# Patient Record
Sex: MALE | Race: WHITE | NOT HISPANIC OR LATINO | Employment: OTHER | URBAN - METROPOLITAN AREA
[De-identification: names, ages, dates, MRNs, and addresses within clinical notes are randomized per-mention and may not be internally consistent; named-entity substitution may affect disease eponyms.]

---

## 2019-05-08 PROBLEM — R13.19 ESOPHAGEAL DYSPHAGIA: Status: ACTIVE | Noted: 2019-05-08

## 2019-05-08 PROBLEM — Z12.11 ENCOUNTER FOR SCREENING FOR MALIGNANT NEOPLASM OF COLON: Status: ACTIVE | Noted: 2019-05-08

## 2023-09-09 ENCOUNTER — HOSPITAL ENCOUNTER (EMERGENCY)
Facility: HOSPITAL | Age: 68
Discharge: OTHER INSTITUTION | End: 2023-09-10
Attending: EMERGENCY MEDICINE
Payer: MEDICARE

## 2023-09-09 ENCOUNTER — APPOINTMENT (OUTPATIENT)
Dept: GENERAL RADIOLOGY | Facility: HOSPITAL | Age: 68
End: 2023-09-09
Payer: MEDICARE

## 2023-09-09 ENCOUNTER — APPOINTMENT (OUTPATIENT)
Dept: CT IMAGING | Facility: HOSPITAL | Age: 68
End: 2023-09-09
Payer: MEDICARE

## 2023-09-09 VITALS
BODY MASS INDEX: 26.6 KG/M2 | SYSTOLIC BLOOD PRESSURE: 164 MMHG | DIASTOLIC BLOOD PRESSURE: 104 MMHG | HEIGHT: 71 IN | HEART RATE: 80 BPM | RESPIRATION RATE: 18 BRPM | WEIGHT: 190 LBS | TEMPERATURE: 97 F | OXYGEN SATURATION: 93 %

## 2023-09-09 DIAGNOSIS — S62.91XB OPEN FRACTURE OF RIGHT HAND, INITIAL ENCOUNTER: Primary | ICD-10-CM

## 2023-09-09 DIAGNOSIS — S10.93XA CONTUSION OF NECK, INITIAL ENCOUNTER: ICD-10-CM

## 2023-09-09 LAB
ANION GAP SERPL CALCULATED.3IONS-SCNC: 9 MMOL/L (ref 5–15)
BASOPHILS # BLD AUTO: 0.04 10*3/MM3 (ref 0–0.2)
BASOPHILS NFR BLD AUTO: 0.3 % (ref 0–1.5)
BUN SERPL-MCNC: 22 MG/DL (ref 8–23)
BUN/CREAT SERPL: 22.9 (ref 7–25)
CALCIUM SPEC-SCNC: 9.3 MG/DL (ref 8.6–10.5)
CHLORIDE SERPL-SCNC: 103 MMOL/L (ref 98–107)
CO2 SERPL-SCNC: 27 MMOL/L (ref 22–29)
CREAT SERPL-MCNC: 0.96 MG/DL (ref 0.76–1.27)
DEPRECATED RDW RBC AUTO: 39.6 FL (ref 37–54)
EGFRCR SERPLBLD CKD-EPI 2021: 87.2 ML/MIN/1.73
EOSINOPHIL # BLD AUTO: 0.14 10*3/MM3 (ref 0–0.4)
EOSINOPHIL NFR BLD AUTO: 1 % (ref 0.3–6.2)
ERYTHROCYTE [DISTWIDTH] IN BLOOD BY AUTOMATED COUNT: 12.2 % (ref 12.3–15.4)
GLUCOSE SERPL-MCNC: 109 MG/DL (ref 65–99)
HCT VFR BLD AUTO: 47.6 % (ref 37.5–51)
HGB BLD-MCNC: 16.4 G/DL (ref 13–17.7)
IMM GRANULOCYTES # BLD AUTO: 0.08 10*3/MM3 (ref 0–0.05)
IMM GRANULOCYTES NFR BLD AUTO: 0.6 % (ref 0–0.5)
LYMPHOCYTES # BLD AUTO: 1.02 10*3/MM3 (ref 0.7–3.1)
LYMPHOCYTES NFR BLD AUTO: 7.2 % (ref 19.6–45.3)
MCH RBC QN AUTO: 31 PG (ref 26.6–33)
MCHC RBC AUTO-ENTMCNC: 34.5 G/DL (ref 31.5–35.7)
MCV RBC AUTO: 90 FL (ref 79–97)
MONOCYTES # BLD AUTO: 1.05 10*3/MM3 (ref 0.1–0.9)
MONOCYTES NFR BLD AUTO: 7.4 % (ref 5–12)
NEUTROPHILS NFR BLD AUTO: 11.83 10*3/MM3 (ref 1.7–7)
NEUTROPHILS NFR BLD AUTO: 83.5 % (ref 42.7–76)
NRBC BLD AUTO-RTO: 0 /100 WBC (ref 0–0.2)
PLATELET # BLD AUTO: 224 10*3/MM3 (ref 140–450)
PMV BLD AUTO: 9 FL (ref 6–12)
POTASSIUM SERPL-SCNC: 3.9 MMOL/L (ref 3.5–5.2)
RBC # BLD AUTO: 5.29 10*6/MM3 (ref 4.14–5.8)
SODIUM SERPL-SCNC: 139 MMOL/L (ref 136–145)
WBC NRBC COR # BLD: 14.16 10*3/MM3 (ref 3.4–10.8)

## 2023-09-09 PROCEDURE — 70498 CT ANGIOGRAPHY NECK: CPT

## 2023-09-09 PROCEDURE — 99285 EMERGENCY DEPT VISIT HI MDM: CPT

## 2023-09-09 PROCEDURE — 85025 COMPLETE CBC W/AUTO DIFF WBC: CPT | Performed by: EMERGENCY MEDICINE

## 2023-09-09 PROCEDURE — 73130 X-RAY EXAM OF HAND: CPT

## 2023-09-09 PROCEDURE — 25010000002 DIPHENHYDRAMINE PER 50 MG: Performed by: EMERGENCY MEDICINE

## 2023-09-09 PROCEDURE — 96375 TX/PRO/DX INJ NEW DRUG ADDON: CPT

## 2023-09-09 PROCEDURE — 25510000001 IOPAMIDOL PER 1 ML: Performed by: EMERGENCY MEDICINE

## 2023-09-09 PROCEDURE — 80048 BASIC METABOLIC PNL TOTAL CA: CPT | Performed by: EMERGENCY MEDICINE

## 2023-09-09 PROCEDURE — 96365 THER/PROPH/DIAG IV INF INIT: CPT

## 2023-09-09 PROCEDURE — 25010000002 CEFAZOLIN IN DEXTROSE 2-4 GM/100ML-% SOLUTION: Performed by: EMERGENCY MEDICINE

## 2023-09-09 RX ORDER — HYDROCHLOROTHIAZIDE 12.5 MG/1
12.5 TABLET ORAL DAILY
COMMUNITY

## 2023-09-09 RX ORDER — DIPHENHYDRAMINE HYDROCHLORIDE 50 MG/ML
25 INJECTION INTRAMUSCULAR; INTRAVENOUS ONCE
Status: DISCONTINUED | OUTPATIENT
Start: 2023-09-09 | End: 2023-09-09

## 2023-09-09 RX ORDER — LIDOCAINE HYDROCHLORIDE AND EPINEPHRINE 10; 10 MG/ML; UG/ML
10 INJECTION, SOLUTION INFILTRATION; PERINEURAL ONCE
Status: COMPLETED | OUTPATIENT
Start: 2023-09-09 | End: 2023-09-09

## 2023-09-09 RX ORDER — DIPHENHYDRAMINE HYDROCHLORIDE 50 MG/ML
25 INJECTION INTRAMUSCULAR; INTRAVENOUS ONCE
Status: COMPLETED | OUTPATIENT
Start: 2023-09-09 | End: 2023-09-09

## 2023-09-09 RX ORDER — CEFAZOLIN SODIUM 2 G/100ML
2000 INJECTION, SOLUTION INTRAVENOUS ONCE
Status: COMPLETED | OUTPATIENT
Start: 2023-09-09 | End: 2023-09-09

## 2023-09-09 RX ORDER — DIPHENHYDRAMINE HYDROCHLORIDE 50 MG/ML
50 INJECTION INTRAMUSCULAR; INTRAVENOUS ONCE
Status: DISCONTINUED | OUTPATIENT
Start: 2023-09-09 | End: 2023-09-09

## 2023-09-09 RX ADMIN — DIPHENHYDRAMINE HYDROCHLORIDE 25 MG: 50 INJECTION, SOLUTION INTRAMUSCULAR; INTRAVENOUS at 21:23

## 2023-09-09 RX ADMIN — CEFAZOLIN SODIUM 2000 MG: 2 INJECTION, SOLUTION INTRAVENOUS at 19:10

## 2023-09-09 RX ADMIN — LIDOCAINE HYDROCHLORIDE,EPINEPHRINE BITARTRATE 10 ML: 10; .01 INJECTION, SOLUTION INFILTRATION; PERINEURAL at 19:09

## 2023-09-09 RX ADMIN — IOPAMIDOL 95 ML: 755 INJECTION, SOLUTION INTRAVENOUS at 20:57

## 2023-09-09 NOTE — ED NOTES
Pt was grinding using a round grind tool and cut left hand and left side of his neck. Pt denies blood thinners.   No

## 2023-09-09 NOTE — ED NOTES
Patient states he was grinding a piece of steel when the blade exploded. Patient c/o laceration to left hand and injury to his right shoulder.

## 2023-09-09 NOTE — ED PROVIDER NOTES
EMERGENCY DEPARTMENT ENCOUNTER    Room Number:  17/17  Date of encounter:  9/10/2023  PCP: Mirza Gotti MD  Historian: Patient and spouse  Relevant information and history provided by sources other than the patient will be included below and in the ED Course.  Review of pertinent past medical records may also be included in record below and ED Course.    HPI:  Chief Complaint: Injury to left hand and neck  A complete HPI/ROS/PMH/PSH/SH/FH are unobtainable due to: Not applicable  Context: Polo Pack is a 67 y.o. male who presents to the ED c/o this is a patient that was using a  with a steel blade.  He was grinding steel.  This is a very dirty and greasy and oily job.  The blade ruptured called the causing a laceration to his left hand.  Also had some abrasions to the right side of his neck.  He denies any numbness or tingling.  His tetanus is up-to-date.  This happened just prior to arrival here.  Currently he has no pain unless you go and press on his left hand.        Previous Episodes: No  Current Symptoms: See above    MEDICAL HISTORY REVIEWED    This is a history of hypertension, GERD.  He is not on any blood thinning medicine.    PAST MEDICAL HISTORY  Active Ambulatory Problems     Diagnosis Date Noted    Esophageal dysphagia 05/08/2019    Encounter for screening for malignant neoplasm of colon 05/08/2019    Esophageal stricture 05/05/2022     Resolved Ambulatory Problems     Diagnosis Date Noted    No Resolved Ambulatory Problems     Past Medical History:   Diagnosis Date    GERD (gastroesophageal reflux disease)     Hypertension     Seasonal allergies     Swallowing difficulty          PAST SURGICAL HISTORY  Past Surgical History:   Procedure Laterality Date    COLONOSCOPY      COLONOSCOPY N/A 07/23/2019    Procedure: COLONOSCOPY to cecum with cold polypectomy;  Surgeon: Meghann Zamora MD;  Location: [facility] ENDOSCOPY;  Service: Gastroenterology    ENDOSCOPY N/A 07/23/2019     Procedure: ESOPHAGOGASTRODUODENOSCOPY with balloon dilation 10-12mm, biopses;  Surgeon: Meghann Zamora MD;  Location: [facility] ENDOSCOPY;  Service: Gastroenterology    ENDOSCOPY N/A 5/17/2022    Procedure: ESOPHAGOGASTRODUODENOSCOPY WITH BALLOON DILATATION 10-11-12-13.5-15MM;  Surgeon: Meghann Zamora MD;  Location: [facility] ENDOSCOPY;  Service: Gastroenterology;  Laterality: N/A;  PRE- dysphagia, hx stricture  POST- esophageal stricture         FAMILY HISTORY  Family History   Problem Relation Age of Onset    Cancer Mother     No Known Problems Father     Malig Hyperthermia Neg Hx          SOCIAL HISTORY  Social History     Socioeconomic History    Marital status:    Tobacco Use    Smoking status: Never    Smokeless tobacco: Never   Vaping Use    Vaping Use: Never used   Substance and Sexual Activity    Alcohol use: No    Drug use: No    Sexual activity: Yes     Partners: Female     Birth control/protection: None         ALLERGIES  Patient has no known allergies.        REVIEW OF SYSTEMS  Review of Systems     All systems reviewed and negative except for those discussed in HPI.       PHYSICAL EXAM    I have reviewed the triage vital signs and nursing notes.    ED Triage Vitals   Temp Heart Rate Resp BP SpO2   09/09/23 1749 09/09/23 1749 09/09/23 1749 09/09/23 1826 09/09/23 1749   97.3 °F (36.3 °C) 60 20 (!) 167/104 97 %      Temp src Heart Rate Source Patient Position BP Location FiO2 (%)   09/09/23 1749 09/09/23 1749 -- -- --   Tympanic Monitor          GENERAL: Elderly male.  No acute distress.Vital signs on my initial evaluation have been reviewed and unremarkable  HENT: nares patent  Head/neck/ face are symmetric without gross deformity, signs of trauma, or swelling  EYES: no scleral icterus, no conjunctival pallor.  NECK: Supple, no meningismus.  Patient has to the lateral portion of his neck he has a very superficial abrasion.  But he does have some tenderness and mild amount of swelling.  It is not  a has expanding hematoma.  There is no active bleeding.  To the area of his distal clavicle on the right he has another area of superficial abrasion.  There is no bleeding.  CV: regular rhythm, regular rate with intact distal pulses.  RESPIRATORY: normal effort and no respiratory distress.  ABDOMEN: soft and nontender.  MUSCULOSKELETAL: no deformity.  To his left hand right in his thenar eminence and the palm of his left hand has a deep laceration through the muscle.  His capillary refill is intact he is got good blood flow.  He has no motor or sensory impairment with good flexion, extension to his thumb index finger and middle finger.  No active bleeding.  Is also important to note that his hands are very dirty.  There is no obvious foreign body appreciated.  The laceration is through the muscle belly of the thenar eminence.  NEURO: alert and appropriate, moves all extremities, follows commands.  No focal motor or sensory changes  SKIN: warm, dry    Vital signs and nursing notes reviewed.        LAB RESULTS  Recent Results (from the past 24 hour(s))   Basic Metabolic Panel    Collection Time: 09/09/23  7:18 PM    Specimen: Blood   Result Value Ref Range    Glucose 109 (H) 65 - 99 mg/dL    BUN 22 8 - 23 mg/dL    Creatinine 0.96 0.76 - 1.27 mg/dL    Sodium 139 136 - 145 mmol/L    Potassium 3.9 3.5 - 5.2 mmol/L    Chloride 103 98 - 107 mmol/L    CO2 27.0 22.0 - 29.0 mmol/L    Calcium 9.3 8.6 - 10.5 mg/dL    BUN/Creatinine Ratio 22.9 7.0 - 25.0    Anion Gap 9.0 5.0 - 15.0 mmol/L    eGFR 87.2 >60.0 mL/min/1.73   CBC Auto Differential    Collection Time: 09/09/23  7:18 PM    Specimen: Blood   Result Value Ref Range    WBC 14.16 (H) 3.40 - 10.80 10*3/mm3    RBC 5.29 4.14 - 5.80 10*6/mm3    Hemoglobin 16.4 13.0 - 17.7 g/dL    Hematocrit 47.6 37.5 - 51.0 %    MCV 90.0 79.0 - 97.0 fL    MCH 31.0 26.6 - 33.0 pg    MCHC 34.5 31.5 - 35.7 g/dL    RDW 12.2 (L) 12.3 - 15.4 %    RDW-SD 39.6 37.0 - 54.0 fl    MPV 9.0 6.0 - 12.0 fL     Platelets 224 140 - 450 10*3/mm3    Neutrophil % 83.5 (H) 42.7 - 76.0 %    Lymphocyte % 7.2 (L) 19.6 - 45.3 %    Monocyte % 7.4 5.0 - 12.0 %    Eosinophil % 1.0 0.3 - 6.2 %    Basophil % 0.3 0.0 - 1.5 %    Immature Grans % 0.6 (H) 0.0 - 0.5 %    Neutrophils, Absolute 11.83 (H) 1.70 - 7.00 10*3/mm3    Lymphocytes, Absolute 1.02 0.70 - 3.10 10*3/mm3    Monocytes, Absolute 1.05 (H) 0.10 - 0.90 10*3/mm3    Eosinophils, Absolute 0.14 0.00 - 0.40 10*3/mm3    Basophils, Absolute 0.04 0.00 - 0.20 10*3/mm3    Immature Grans, Absolute 0.08 (H) 0.00 - 0.05 10*3/mm3    nRBC 0.0 0.0 - 0.2 /100 WBC       Ordered the above labs and independently reviewed the results.        RADIOLOGY  XR Hand 3+ View Left    Result Date: 9/9/2023  XR HAND 3+ VW LEFT-  HISTORY: 67-year-old male with laceration to hand. Evaluate for radiopaque foreign body.  FINDINGS: There is a comminuted predominantly obliquely oriented fracture at the shaft of the first metacarpal and there is a prominent soft tissue laceration adjacently. The multiple punctate hyperdensities within the soft tissues in this region may be overlying the skin, but foreign bodies within the soft tissues can't be excluded. After copious irrigation, a follow-up radiograph of the thumb is recommended.      CT Angiogram Neck    Result Date: 9/9/2023  Patient: JJ BARKER  Time Out: 23:10 Exam(s): CTA NECK EXAM:   CT Angiography Neck With Intravenous Contrast CLINICAL HISTORY:    Reason for exam: Using a . Has a contusion to the right lateral portion of his neck that is mild. Given mechanism of injury I did wanted to rule out any vascular injury.. TECHNIQUE:   Routine carotid CT angiography protocol was performed with intravenous contrast.  NASCET criteria using the distal ICAs for comparison were used for evaluation of stenoses.  CTDI is 46.3 mGy and DLP is 1346.9 mGy-cm.  This CT exam was performed according to the principle of ALARA (As Low As Reasonably Achievable)  by using one or more of the following dose reduction techniques: automated exposure control, adjustment of the mA and or kV according to patient size, and or use of iterative reconstruction technique. MIP reconstructed images were created and reviewed.  Zchmus256 95ml IV contrast is injected for this exam. COMPARISON:   None. FINDINGS:   Right common carotid artery:   Patent.   Right internal carotid artery:   Patent.   Right vertebral artery:    Patent.   Left common carotid artery:    Patent.   Left internal carotid artery:  Patent.   Left vertebral artery:    Patent.  Codominant.   Other: No significant atherosclerosis or stenosis. IMPRESSION: 1.  No dissection, occlusion, or significant stenosis.  CAROTID STENOSIS REFERENCE USING NASCET CRITERIA:   % ICA stenosis = (1 - narrowest ICA diameter diameter of distal cervical ICA) x 100.   Mild - <50% stenosis.   Moderate - 50-69% stenosis.   Severe - 70-94% stenosis.   Near occlusion - 95-99% stenosis.   Occluded - 100% stenosis.     Electronically signed by Mara Chun M.D. on 09-09-23 at 2310     I ordered the above noted radiological studies. Reviewed by me and discussed with radiologist.  See dictation for official radiology interpretation.      PROCEDURES    Procedures      MEDICATIONS GIVEN IN ER    Medications   ceFAZolin in dextrose (ANCEF) IVPB solution 2,000 mg (0 mg Intravenous Stopped 9/9/23 2104)   lidocaine 1% - EPINEPHrine 1:759644 (XYLOCAINE W/EPI) 1 %-1:358307 injection 10 mL (10 mL Injection Given 9/9/23 1909)   iopamidol (ISOVUE-370) 76 % injection 100 mL (95 mL Intravenous Given 9/9/23 2057)   diphenhydrAMINE (BENADRYL) injection 25 mg (25 mg Intravenous Given 9/9/23 2123)         All labs have been independently reviewed by me.  All radiology studies have been reviewed by me and I discussed with radiologist dictating the report when indicated below.  All EKG's independently viewed and interpreted by me.  Discussion below represents my  analysis of pertinent findings related to patient's condition, differential diagnosis, treatment plan and final disposition.        PROGRESS, DATA ANALYSIS, CONSULTS, AND MEDICAL DECISION MAKING    Given this mechanism is of injury and the fact that he has a little bruise to the lateral portion of his neck I will do a CT angiogram of his neck.  He does not have an expanding hematoma there is no active bleeding he is neuro logically intact no numbness or tingling.  We will continue to watch him closely.  I discussed the concerns with the patient as well as the spouse I think he has a very high risk of getting infection to the left hand laceration.  We will get a give him a dose of antibiotics here.  All questions answered.      ED Course as of 09/10/23 0212   Sat Sep 09, 2023   1821 First look: Patient here today with a laceration to his left palm that occurred when a grinding tool ruptured and cut his hand.  Denies any numbness, maintains intact range of motion of the thumb.  Has superficial lacerations over his right neck and shoulder area that are nonbleeding.  At this time plan for x-ray of the left hand to eval for any evidence of foreign body, denies significant pain at rest currently.  Not on any anticoagulation.  Tetanus was updated 1 year ago.  I do not see any emergent need for imaging of the neck or shoulder given the superficial nature of those wounds.  [DC]   2020 My own independent examination of the hand reveals a comminuted oblique fracture of the shaft of the first metacarpal.  There obviously is soft tissue swelling and laceration present.  I did review the radiologist report as well. [MM]   2038 I spoke with Lady who is the nurse practitioner on for hand surgery at [facility].  I informed her the fact that this gentleman has an open fracture.  Patient has been given antibiotics we closed the wound after extensive irrigation.  They would like to see the patient tonight downtown at [facility].  They  will decide whether they can take him to the operating room.  We will get a wait till the CT angiogram of the neck is complete.  I do have a low clinical sufficient that he is getting have any abnormality but given the mechanism of injury I think with safe to do the angiogram to make sure he does not have any major vessel injury to his neck. [MM]   2106 Patient had a little itching when he returned from the CT scan.  He states the itching is better.  There is no rash there is no swelling there is no shortness of breath.  Is mainly to his scalp.  We will get a give him some Benadryl. [MM]   2107 Talked with the patient and the wife informed him of my conversation with the hand surgeon and the plan to transfer him to [facility] after we get the results of the CT angiogram of his neck. [MM]   2107 On reexamination of his neck there is no change from initial exam.  No expanding hematoma.  He is neurologically intact.  No active bleeding [MM]   2313 I reviewed the CT scan angiogram report from the radiologist.  There is no dissection, occlusion or significant stenosis noted.  Please see complete dictated report from radiologist [MM]      ED Course User Index  [DC] Terell Loja MD  [MM] Derick Rice MD       AS OF 02:12 EDT VITALS:    BP - (!) 164/104  HR - 80  TEMP - 97 °F (36.1 °C) (Oral)  02 SATS - 93%    SOCIAL DETERMINANTS OF HEALTH THAT IMPACT OR LIMIT CARE (For example..Homelessness,safe discharge, inability to obtain care, follow up, or prescriptions):      DIAGNOSIS  Final diagnoses:   Open fracture of right hand, initial encounter: Right first metacarpal   Contusion of neck, initial encounter         DISPOSITION  Patient will be transferred via private vehicle to [facility].          DICTATED UTILIZING DRAGON DICTATION    Note Disclaimer: At [facility], we believe that sharing information builds trust and better relationships. You are receiving this note because you recently visited  [facility]. It is possible you will see health information before a provider has talked with you about it. This kind of information can be easy to misunderstand. To help you fully understand what it means for your health, we urge you to discuss this note with your provider.       Derick Rice MD  09/10/23 0213

## 2023-09-09 NOTE — ED PROVIDER NOTES
Laceration Repair    Date/Time: 9/9/2023 8:47 PM  Performed by: Ana Cunha PA  Authorized by: Derick Rice MD     Consent:     Consent obtained:  Verbal    Consent given by:  Patient    Risks discussed:  Infection    Alternatives discussed:  No treatment  Universal protocol:     Imaging studies available: yes      Patient identity confirmed:  Verbally with patient  Anesthesia:     Anesthesia method:  Local infiltration    Local anesthetic:  Lidocaine 1% WITH epi  Laceration details:     Location:  Hand    Hand location:  L hand, dorsum    Length (cm):  4  Pre-procedure details:     Preparation:  Patient was prepped and draped in usual sterile fashion and imaging obtained to evaluate for foreign bodies  Exploration:     Imaging obtained: x-ray      Imaging outcome: foreign body not noted      Wound exploration: wound explored through full range of motion and entire depth of wound visualized    Treatment:     Area cleansed with:  Saline and Shur-Clens    Amount of cleaning:  Extensive    Irrigation solution:  Sterile saline    Irrigation method:  Pressure wash    Visualized foreign bodies/material removed: no      Debridement:  Extensive  Skin repair:     Repair method:  Sutures    Suture size:  4-0    Suture material:  Nylon    Suture technique:  Simple interrupted    Number of sutures:  14  Approximation:     Approximation:  Close  Repair type:     Repair type:  Intermediate  Post-procedure details:     Dressing:  Antibiotic ointment and non-adherent dressing    Procedure completion:  Tolerated        Ana Cunha PA  09/09/23 2052

## 2024-03-28 ENCOUNTER — TELEPHONE (OUTPATIENT)
Dept: GASTROENTEROLOGY | Facility: CLINIC | Age: 69
End: 2024-03-28
Payer: MEDICARE

## 2024-03-28 NOTE — TELEPHONE ENCOUNTER
Provider: DR ROSEN    Caller: GUICHO    Relationship to Patient: WIFE    Phone Number: [phone]    Reason for Call: GUICHO CALLED IN, SHE WOULD LIKE A CALL BACK TO SCHEDULED JJ'S COLONOSCOPY. YOU CAN LEAVE A DETAILED VMAIL.

## 2024-03-29 ENCOUNTER — PREP FOR SURGERY (OUTPATIENT)
Dept: OTHER | Facility: HOSPITAL | Age: 69
End: 2024-03-29
Payer: MEDICARE

## 2024-03-29 DIAGNOSIS — Z86.010 HISTORY OF ADENOMATOUS POLYP OF COLON: Primary | ICD-10-CM

## 2024-04-02 ENCOUNTER — TELEPHONE (OUTPATIENT)
Dept: GASTROENTEROLOGY | Facility: CLINIC | Age: 69
End: 2024-04-02
Payer: MEDICARE

## 2024-04-02 PROBLEM — Z86.010 HISTORY OF ADENOMATOUS POLYP OF COLON: Status: ACTIVE | Noted: 2024-03-29

## 2024-09-10 ENCOUNTER — ANESTHESIA EVENT (OUTPATIENT)
Dept: GASTROENTEROLOGY | Facility: HOSPITAL | Age: 69
End: 2024-09-10
Payer: MEDICARE

## 2024-09-10 ENCOUNTER — ANESTHESIA (OUTPATIENT)
Dept: GASTROENTEROLOGY | Facility: HOSPITAL | Age: 69
End: 2024-09-10
Payer: MEDICARE

## 2024-09-10 ENCOUNTER — HOSPITAL ENCOUNTER (OUTPATIENT)
Facility: HOSPITAL | Age: 69
Setting detail: HOSPITAL OUTPATIENT SURGERY
Discharge: HOME | End: 2024-09-10
Attending: INTERNAL MEDICINE | Admitting: INTERNAL MEDICINE
Payer: MEDICARE

## 2024-09-10 VITALS
SYSTOLIC BLOOD PRESSURE: 128 MMHG | RESPIRATION RATE: 12 BRPM | DIASTOLIC BLOOD PRESSURE: 99 MMHG | BODY MASS INDEX: 25.3 KG/M2 | HEART RATE: 73 BPM | OXYGEN SATURATION: 95 % | HEIGHT: 72 IN | WEIGHT: 186.8 LBS

## 2024-09-10 PROCEDURE — 25810000003 LACTATED RINGERS PER 1000 ML: Performed by: INTERNAL MEDICINE

## 2024-09-10 PROCEDURE — 25010000002 PROPOFOL 200 MG/20ML EMULSION: Performed by: NURSE ANESTHETIST, CERTIFIED REGISTERED

## 2024-09-10 PROCEDURE — S0260 H&P FOR SURGERY: HCPCS | Performed by: INTERNAL MEDICINE

## 2024-09-10 PROCEDURE — G0105 COLORECTAL SCRN; HI RISK IND: HCPCS | Performed by: INTERNAL MEDICINE

## 2024-09-10 PROCEDURE — 25010000002 PROPOFOL 1000 MG/100ML EMULSION: Performed by: NURSE ANESTHETIST, CERTIFIED REGISTERED

## 2024-09-10 RX ORDER — PROPOFOL 10 MG/ML
INJECTION, EMULSION INTRAVENOUS CONTINUOUS PRN
Status: DISCONTINUED | OUTPATIENT
Start: 2024-09-10 | End: 2024-09-10 | Stop reason: SURG

## 2024-09-10 RX ORDER — SODIUM CHLORIDE 0.9 % (FLUSH) 0.9 %
10 SYRINGE (ML) INJECTION EVERY 12 HOURS SCHEDULED
Status: DISCONTINUED | OUTPATIENT
Start: 2024-09-10 | End: 2024-09-10 | Stop reason: HOSPADM

## 2024-09-10 RX ORDER — AMLODIPINE BESYLATE 5 MG/1
5 TABLET ORAL DAILY
COMMUNITY

## 2024-09-10 RX ORDER — SODIUM CHLORIDE 0.9 % (FLUSH) 0.9 %
10 SYRINGE (ML) INJECTION AS NEEDED
Status: DISCONTINUED | OUTPATIENT
Start: 2024-09-10 | End: 2024-09-10 | Stop reason: HOSPADM

## 2024-09-10 RX ORDER — SODIUM CHLORIDE, SODIUM LACTATE, POTASSIUM CHLORIDE, CALCIUM CHLORIDE 600; 310; 30; 20 MG/100ML; MG/100ML; MG/100ML; MG/100ML
30 INJECTION, SOLUTION INTRAVENOUS CONTINUOUS PRN
Status: DISCONTINUED | OUTPATIENT
Start: 2024-09-10 | End: 2024-09-10 | Stop reason: HOSPADM

## 2024-09-10 RX ORDER — PROPOFOL 10 MG/ML
INJECTION, EMULSION INTRAVENOUS AS NEEDED
Status: DISCONTINUED | OUTPATIENT
Start: 2024-09-10 | End: 2024-09-10 | Stop reason: SURG

## 2024-09-10 RX ORDER — SODIUM CHLORIDE 9 MG/ML
40 INJECTION, SOLUTION INTRAVENOUS AS NEEDED
Status: DISCONTINUED | OUTPATIENT
Start: 2024-09-10 | End: 2024-09-10 | Stop reason: HOSPADM

## 2024-09-10 RX ORDER — LIDOCAINE HYDROCHLORIDE 20 MG/ML
INJECTION, SOLUTION INFILTRATION; PERINEURAL AS NEEDED
Status: DISCONTINUED | OUTPATIENT
Start: 2024-09-10 | End: 2024-09-10 | Stop reason: SURG

## 2024-09-10 RX ADMIN — SODIUM CHLORIDE, POTASSIUM CHLORIDE, SODIUM LACTATE AND CALCIUM CHLORIDE 30 ML/HR: 600; 310; 30; 20 INJECTION, SOLUTION INTRAVENOUS at 07:18

## 2024-09-10 RX ADMIN — PROPOFOL 80 MG: 10 INJECTION, EMULSION INTRAVENOUS at 08:33

## 2024-09-10 RX ADMIN — PROPOFOL INJECTABLE EMULSION 200 MCG/KG/MIN: 10 INJECTION, EMULSION INTRAVENOUS at 08:33

## 2024-09-10 RX ADMIN — LIDOCAINE HYDROCHLORIDE 60 MG: 20 INJECTION, SOLUTION INFILTRATION; PERINEURAL at 08:33

## 2024-09-10 NOTE — ANESTHESIA POSTPROCEDURE EVALUATION
Patient: Polo Pack    Procedure Summary       Date: 09/10/24 Room / Location:  [facility]OSCOPY 1 /  [facility]COPY    Anesthesia Start: 0828 Anesthesia Stop: 0858    Procedure: COLONOSCOPY INTO CECUM AND TI Diagnosis:       History of adenomatous polyp of colon      (History of adenomatous polyp of colon [Z86.010])    Surgeons: Meghann Zamora MD Provider: Matt Gamez MD    Anesthesia Type: MAC ASA Status: 2            Anesthesia Type: MAC    Vitals  Vitals Value Taken Time   /99 09/10/24 0916   Temp     Pulse 75 09/10/24 0917   Resp 12 09/10/24 0915   SpO2 94 % 09/10/24 0917   Vitals shown include unfiled device data.        Post Anesthesia Care and Evaluation    Patient location during evaluation: PACU  Patient participation: complete - patient participated  Level of consciousness: awake and alert  Pain management: adequate    Airway patency: patent  Anesthetic complications: No anesthetic complications    Cardiovascular status: acceptable  Respiratory status: acceptable  Hydration status: acceptable    Comments: --------------------            09/10/24               0915     --------------------   BP:       128/99     Pulse:      73       Resp:       12       SpO2:      95%      --------------------

## 2024-09-10 NOTE — H&P
[facility], operated by [facility]  Pre Procedure History & Physical    Chief Complaint:   History of adenomatous polyp    Subjective     HPI:   67yo here today for colonoscopy.  Pt reports no FH CRC/polyps.  Patient denies GI symptoms currently.  Last exam 2019    Past Medical History:   Past Medical History:   Diagnosis Date    GERD (gastroesophageal reflux disease)     Hypertension     Seasonal allergies     Swallowing difficulty        Past Surgical History:  Past Surgical History:   Procedure Laterality Date    COLONOSCOPY      COLONOSCOPY N/A 07/23/2019    Procedure: COLONOSCOPY to cecum with cold polypectomy;  Surgeon: Meghann Zamora MD;  Location:  [facility] ENDOSCOPY;  Service: Gastroenterology    ENDOSCOPY N/A 07/23/2019    Procedure: ESOPHAGOGASTRODUODENOSCOPY with balloon dilation 10-12mm, biopses;  Surgeon: Meghann Zamora MD;  Location:  [facility] ENDOSCOPY;  Service: Gastroenterology    ENDOSCOPY N/A 5/17/2022    Procedure: ESOPHAGOGASTRODUODENOSCOPY WITH BALLOON DILATATION 10-11-12-13.5-15MM;  Surgeon: Meghann Zamora MD;  Location: [facility] ENDOSCOPY;  Service: Gastroenterology;  Laterality: N/A;  PRE- dysphagia, hx stricture  POST- esophageal stricture       Family History:  Family History   Problem Relation Age of Onset    Cancer Mother     No Known Problems Father     Malig Hyperthermia Neg Hx        Social History:   reports that he has never smoked. He has never used smokeless tobacco. He reports that he does not drink alcohol and does not use drugs.    Medications:   Medications Prior to Admission   Medication Sig Dispense Refill Last Dose    amLODIPine (NORVASC) 5 MG tablet Take 1 tablet by mouth Daily.   9/9/2024    lisinopril (PRINIVIL,ZESTRIL) 40 MG tablet Take 1 tablet by mouth Daily.   9/9/2024    hydroCHLOROthiazide (HYDRODIURIL) 12.5 MG tablet Take 1 tablet by mouth Daily.   Unknown    pantoprazole (PROTONIX) 20 MG EC tablet TAKE ONE TABLET BY MOUTH TWICE A DAY BEFORE MEALS 180 tablet  "0 Unknown       Allergies:  Patient has no known allergies.    ROS:    Pertinent items are noted in HPI     Objective     Blood pressure (!) 156/104, pulse 79, resp. rate 16, height 182.9 cm (72\"), weight 84.7 kg (186 lb 12.8 oz), SpO2 94%.    Physical Exam   Constitutional: Pt is oriented to person, place, and time and well-developed, well-nourished, and in no distress.   Mouth/Throat: Oropharynx is clear and moist.   Neck: Normal range of motion.   Cardiovascular: Normal rate, regular rhythm    Pulmonary/Chest: Effort normal    Abdominal: Soft. Nontender  Skin: Skin is warm and dry.   Psychiatric: Mood, memory, affect and judgment normal.     Assessment & Plan     Diagnosis:  H/o adenomatous polyp    Anticipated Surgical Procedure:  colonoscopy    The risks, benefits, and alternatives of this procedure have been discussed with the patient or the responsible party- the patient understands and agrees to proceed.                                                              "

## 2024-09-10 NOTE — ANESTHESIA PREPROCEDURE EVALUATION
Anesthesia Evaluation     Patient summary reviewed and Nursing notes reviewed                Airway   Mallampati: II  Dental      Pulmonary - negative pulmonary ROS   Cardiovascular     ECG reviewed  Rhythm: regular  Rate: normal    (+) hypertension      Neuro/Psych- negative ROS  GI/Hepatic/Renal/Endo    (+) GERD    Musculoskeletal (-) negative ROS    Abdominal    Substance History - negative use     OB/GYN negative ob/gyn ROS         Other                    Anesthesia Plan    ASA 2     MAC     intravenous induction     Anesthetic plan, risks, benefits, and alternatives have been provided, discussed and informed consent has been obtained with: patient.    CODE STATUS:

## (undated) DEVICE — TUBING, SUCTION, 1/4" X 10', STRAIGHT: Brand: MEDLINE

## (undated) DEVICE — KT ORCA ORCAPOD DISP STRL

## (undated) DEVICE — CANN O2 ETCO2 FITS ALL CONN CO2 SMPL A/ 7IN DISP LF

## (undated) DEVICE — SENSR O2 OXIMAX FNGR A/ 18IN NONSTR

## (undated) DEVICE — LN SMPL CO2 SHTRM SD STREAM W/M LUER

## (undated) DEVICE — ADAPT CLN BIOGUARD AIR/H2O DISP